# Patient Record
Sex: FEMALE | Race: WHITE | NOT HISPANIC OR LATINO | URBAN - METROPOLITAN AREA
[De-identification: names, ages, dates, MRNs, and addresses within clinical notes are randomized per-mention and may not be internally consistent; named-entity substitution may affect disease eponyms.]

---

## 2023-12-12 NOTE — PROGRESS NOTES
New patient visit for throat discomfort    Patient reports pain starting on left TMJ that we will progress towards neck.  Reports that swallowing will aggravate pain but has been constant for the last few months.  Does not have other cervical complaints.     Patient has gone through screening that included thyroid ultrasound cervical CT scan without significant changes.    Also reports sensorineural hearing loss at the right ear.  Previous MRI shows no change.    Patient is concerned about possible diagnosis of Cancer.       Physical Exam:    Physical Exam:    GENERAL:  Well-developed, well-nourished.      EYES:  Ocular motility intact.       EARS:  Otoscopy of external auditory canals and tympanic membranes is normal with clinical speech reception thresholds grossly intact. No mass/lesion of auricle.      NOSE:   Anterior rhinoscopy shows septum is deviated to the right with bilateral inferior turbinate hypertrophy.   Right Turbinate: 3  Left Turbinate: 3  Nasal Valve collapse: No, [place] Maneuver is negative  Nasal mucosa is unremarkable.  There are no other masses polyps or purulent drainage.      NECK:  No cervical lymphadenopathy, no neck mass/crepitus/ asymmetry, trachea is midline, no thyroid enlargement/tenderness/mass.      OROPHARYNX:   Tonsil size: 1  Right tonsil is significantly encrypted.   Modified Mallampatti tongue position: 2  Tongue position is Freidman 2  Scalloping is noted.   Soft Palate: is normal    Cervical:  Cervical examination is normal a part from tender palpation to lmyphonode 2B on the right side.      MAXILLOFACIAL:   On frontal repose, patient shows symmetrical facial thirds, symmetrical facial fifths.   There is not paranasal flattening.  There is not deep nasolabial folds.  On profile view, the patient has a adequate facial profile, with a Class 1 Facial Skeletal relationship. There is not a dorsal nasal hump.       DENTAL:  The occlusal plane is normal. No Overjet. Overbite is not  deep.  Right - Class 1 canine, Class 1 molar  Left - Class 1 canine, Class 1 molar  There is no transverse discrepancy with narrow maxilla.  Dentition: There is adequate dentition but cusps are worn with posterior molars significant worn cusps. There is no dental crowding.   Posterior cross-bite to the right.      TMJ:  On TMJ examination, the maximal incisal opening is about 45 mm. The jaw does deviate towards the left upon opening.  There is disc re capture upon closing. There is TMJ tenderness to the left upon function.      NEURO/PSYCH:  Cranial nerves grossly intact, oriented x3 (time, place, person), appropriate mood and affect.      RESPIRATION:  Symmetric expansion during respiration, normal respiratory effort.      CARDIOVASCULAR: Pulse is regular rhythm and rate      Procedure: Diagnostic Nasal/ Pharyngeal Endoscopy     Indication for procedure: To evaluate static and dynamic upper anatomy not visible by anterior rhinoscopy and oral cavitiy examination for anatomic risk factors of obstructive sleep apnea.      Anesthesia: 1% phenylephrine,4% lidocaine topical spray     Description:   A flexible endoscope was used to examine the left and right nasal cavities.  The nasal valve areas were examined for abnormalities or collapse.  The inferior and middle turbinates were evaluated and abnormalities noted. The scope was then passed through the nasopharygneal, oropharyngeal, and hypopharyngeal airway.     Findings:   Internal nasal valve - no significant collapse  Inferior turbinates - bilat enlarged, pale mucosa  Septum - septal deviation to the left  Middle meatuses - Patent and clear.  Superior meatuses and sphenoethmoid recesses - patent and clear  Nasopharynx - clear without lesions or masses  Coupling - Positive  Narrowed retropalatal space. Patient   No Adenoids signs of previous adenoidectomy    Some edema is noticed at the interarytenoid area.   [place] Maneuver:III  Lingual tonsils grade:II  Vocal Fold  Visualization:III  Mucosa is preserved throughout the exam.   No other significant changes are noticed.     Diagnose:  Chronic cervical neck pain to the left  DTMJ? Eagle Synd? Chronic Tonsillitis?  Sensorineural hearing loss to the right.     Plan:  Patient will bring previous CT scan to be analyzed.

## 2023-12-13 ENCOUNTER — CLINICAL SUPPORT (OUTPATIENT)
Dept: AUDIOLOGY | Facility: CLINIC | Age: 48
End: 2023-12-13
Payer: COMMERCIAL

## 2023-12-13 ENCOUNTER — OFFICE VISIT (OUTPATIENT)
Dept: OTOLARYNGOLOGY | Facility: CLINIC | Age: 48
End: 2023-12-13
Payer: COMMERCIAL

## 2023-12-13 DIAGNOSIS — H91.93 BILATERAL HEARING LOSS, UNSPECIFIED HEARING LOSS TYPE: ICD-10-CM

## 2023-12-13 DIAGNOSIS — H90.41 SENSORINEURAL HEARING LOSS (SNHL) OF RIGHT EAR WITH UNRESTRICTED HEARING OF LEFT EAR: Primary | ICD-10-CM

## 2023-12-13 DIAGNOSIS — H92.02 LEFT EAR PAIN: ICD-10-CM

## 2023-12-13 DIAGNOSIS — H93.8X2 EAR FULLNESS, LEFT: ICD-10-CM

## 2023-12-13 PROCEDURE — 92557 COMPREHENSIVE HEARING TEST: CPT | Performed by: AUDIOLOGIST

## 2023-12-13 PROCEDURE — 31575 DIAGNOSTIC LARYNGOSCOPY: CPT

## 2023-12-13 PROCEDURE — 92550 TYMPANOMETRY & REFLEX THRESH: CPT | Performed by: AUDIOLOGIST

## 2023-12-13 PROCEDURE — 99214 OFFICE O/P EST MOD 30 MIN: CPT

## 2023-12-13 RX ORDER — BUPROPION HYDROCHLORIDE 200 MG/1
200 TABLET, EXTENDED RELEASE ORAL 2 TIMES DAILY
COMMUNITY
Start: 2023-08-21

## 2023-12-13 NOTE — PROGRESS NOTES
"  ADULT AUDIOMETRIC EVALUATION    Name:  Manuel Hemphill  :  1975  Age:  48 y.o.  Date of Evaluation:  2023    HISTORY:  Reason for visit: Ms. Hemphill is seen today for an evaluation of hearing at the request of Dr. Kelley. Patient was unaccompanied.    Patient reports known hearing loss in the right ear that was diagnosed at [facility] in 2021. She reports distortion to sound in the right ear and preferring to use the left ear for telephone calls. She reports the right hearing loss was of somewhat gradual onset in/around . She also reports left sided aural fullness and pain that radiates down into her neck.     Hearing Aid History: Patient does not have hearing aids at this time.    EVALUATION:    See Audiogram and Immittance results under \"Media\".    RESULTS:     Otoscopic Evaluation: Deferred as patient came over from ENT    Immittance:   Immittance Measures: 226 Hz          Right Ear: Type A: Normal middle ear function         Left Ear:  Type A: Normal middle ear function    Reflexes and Reflex Decay:    Ipsilateral Reflexes (500-4000 Hz):          Probe/Stimulus Right Ear: present 4000 Hz, hyper-compliance at 500-2000 Hz       Probe/Stimulus Left Ear: present    Otoacoustic Emissions [DP(OAEs)]:  Right Ear: Present at 2065-5844 Hz, absent at 5580-7104 Hz. This is consistent with abnormal cochlear function at the outer hair cell level.  Left Ear: Present at 1380-2108 Hz, absent at 9468-2746 Hz. This is consistent with abnormal cochlear function at the outer hair cell level.         Audiometry:  Test Technique: Standard Audiometry under insert phones.    Reliability: Good     Pure Tone Audiometry:    Right: Hearing sensitivity within normal limits 125-500 Hz falling to a mild sloping to severe sensorineural hearing loss at 0599-2688 Hz.   Left: Hearing sensitivity within normal limits 125-8000 Hz       Speech Audiometry (via recorded, 25-words unless noted; M=masked):       Right Ear: " Speech Reception Threshold (SRT) was obtained at 25(M) dBHL  Word Recognition Scores were Excellent (100%) in quiet when words were presented at 70(M) dBHL, using the NU-6 2A word list.  Left Ear: Speech Reception Threshold (SRT) was obtained at 5 dBHL  Word Recognition Scores were Excellent (100%) in quiet when words were presented at 50 dBHL, using the NU-6 3A word list.      IMPRESSIONS:  Today's test results suggest normal middle ear function and unilateral sensorineural hearing loss in the right ear. Hearing levels are within normal limits in the left ear and there is mild sloping to severe sensorineural hearing loss 8147-3111 Hz in the right ear. Word understanding is excellent, bilaterally.     PATIENT EDUCATION:   Ms. Hemphill was counseled with regard to the findings. Hearing aids were discussed as a management option for hearing loss. Patient was encouraged to pursue hearing aid for the right ear pending medical clearance and motivation.      PLAN:  Medical follow up with Dr. Kelley as directed.  Retest hearing in conjunction with medical management or at least annually. Return sooner if any concerns arise.  Consideration of hearing aid evaluation to assess need for hearing amplification. Call insurance to inquire about hearing aid benefits and in-network providers.        Jannie Samuel, MEG, CCC-A  Clinical Audiologist    Time: [id number]    Degree of   Hearing Sensitivity dB Range   Within Normal Limits (WNL) 0 - 20   Slight 25   Mild 26 - 40   Moderate 41 - 55   Moderately-Severe 56 - 70   Severe 71 - 90   Profound 91 +     KEY  TM Tympanic Membrane   WNL Within Normal Limits   HA Hearing Aid   SNHL Sensorineural Hearing Loss   CHL Conductive Hearing Loss   NIHL Noise-Induced Hearing Loss   ECV Ear Canal Volume

## 2023-12-29 ENCOUNTER — TELEPHONE (OUTPATIENT)
Dept: OTOLARYNGOLOGY | Facility: HOSPITAL | Age: 48
End: 2023-12-29
Payer: COMMERCIAL

## 2023-12-29 NOTE — TELEPHONE ENCOUNTER
Topic: Hearing test from [facility] and CT scan CD from [address]    I called pt and LM telling her that we do have the results from her hearing test at [facility] on 10/2021. These results have been sent to DR Kelley and will be scanned into her chart.  Pt told me at an earlier date that she had brought a CD of her CT scan to the [place] office and gave it to the male  on Thursday 12/14.   I checked with the  and he states that any CD he was given was placed in the red bag and transported to the main campus provider in [place] [address].  Pt aware of the above